# Patient Record
Sex: MALE | Race: WHITE | Employment: FULL TIME | URBAN - METROPOLITAN AREA
[De-identification: names, ages, dates, MRNs, and addresses within clinical notes are randomized per-mention and may not be internally consistent; named-entity substitution may affect disease eponyms.]

---

## 2022-04-11 ENCOUNTER — TELEPHONE (OUTPATIENT)
Dept: PULMONOLOGY | Age: 27
End: 2022-04-11

## 2022-04-11 NOTE — TELEPHONE ENCOUNTER
Npt ref by Noman Shen for TREVOR     [facility] to schedule with [facility].4/11/22

## 2022-05-05 ENCOUNTER — OFFICE VISIT (OUTPATIENT)
Dept: PULMONOLOGY | Age: 27
End: 2022-05-05
Payer: COMMERCIAL

## 2022-05-05 VITALS
HEIGHT: 69 IN | DIASTOLIC BLOOD PRESSURE: 80 MMHG | HEART RATE: 87 BPM | WEIGHT: 277 LBS | BODY MASS INDEX: 41.03 KG/M2 | SYSTOLIC BLOOD PRESSURE: 122 MMHG | OXYGEN SATURATION: 98 %

## 2022-05-05 DIAGNOSIS — R06.81 WITNESSED APNEIC SPELLS: ICD-10-CM

## 2022-05-05 DIAGNOSIS — G47.30 SLEEP-DISORDERED BREATHING: Primary | ICD-10-CM

## 2022-05-05 DIAGNOSIS — G47.26 SHIFT WORK SLEEP DISORDER: ICD-10-CM

## 2022-05-05 DIAGNOSIS — R06.83 SNORING: ICD-10-CM

## 2022-05-05 DIAGNOSIS — G47.19 EXCESSIVE DAYTIME SLEEPINESS: ICD-10-CM

## 2022-05-05 PROBLEM — E03.9 HYPOTHYROIDISM: Status: ACTIVE | Noted: 2022-05-05

## 2022-05-05 PROBLEM — E03.9 HYPOTHYROIDISM: Chronic | Status: ACTIVE | Noted: 2022-05-05

## 2022-05-05 PROCEDURE — 99204 OFFICE O/P NEW MOD 45 MIN: CPT

## 2022-05-05 RX ORDER — AMITRIPTYLINE HYDROCHLORIDE 10 MG/1
TABLET, FILM COATED ORAL
COMMUNITY
Start: 2022-04-11

## 2022-05-05 RX ORDER — PROPRANOLOL HCL 60 MG
CAPSULE, EXTENDED RELEASE 24HR ORAL
COMMUNITY
Start: 2022-04-11

## 2022-05-05 RX ORDER — LEVOTHYROXINE SODIUM 25 UG/1
TABLET ORAL
COMMUNITY
Start: 2022-04-11

## 2022-05-05 ASSESSMENT — SLEEP AND FATIGUE QUESTIONNAIRES
HOW LIKELY ARE YOU TO NOD OFF OR FALL ASLEEP IN A CAR, WHILE STOPPED FOR A FEW MINUTES IN TRAFFIC: 0
HOW LIKELY ARE YOU TO NOD OFF OR FALL ASLEEP WHILE SITTING AND TALKING TO SOMEONE: 0
NECK CIRCUMFERENCE (INCHES): 18.5
HOW LIKELY ARE YOU TO NOD OFF OR FALL ASLEEP WHILE SITTING AND READING: 1
HOW LIKELY ARE YOU TO NOD OFF OR FALL ASLEEP WHILE WATCHING TV: 3
HOW LIKELY ARE YOU TO NOD OFF OR FALL ASLEEP WHILE SITTING QUIETLY AFTER LUNCH WITHOUT ALCOHOL: 1
ESS TOTAL SCORE: 11
HOW LIKELY ARE YOU TO NOD OFF OR FALL ASLEEP WHILE LYING DOWN TO REST IN THE AFTERNOON WHEN CIRCUMSTANCES PERMIT: 3
HOW LIKELY ARE YOU TO NOD OFF OR FALL ASLEEP WHEN YOU ARE A PASSENGER IN A CAR FOR AN HOUR WITHOUT A BREAK: 3
HOW LIKELY ARE YOU TO NOD OFF OR FALL ASLEEP WHILE SITTING INACTIVE IN A PUBLIC PLACE: 0

## 2022-05-05 NOTE — PROGRESS NOTES
PULMONARY, CRITICAL CARE AND SLEEP MEDICINE   CC: Snoring  Referring Provider: Patient is being seen at the request of Rachel Quintero CNP, for a consultation to evaluate for Obstructive Sleep Apnea. Presenting HPI: 29 yo male with a 20 year history of severe snoring, worse in supine position and present even as a kid (initially improved after tonsillectomy), associated with daytime sleepiness, observed apneas. No treatments tried so far. Has not been evaluated for sleep apnea in the past. He has worked 3rd shift for a few years as a  and will likely continue to be on this shift for a while. These symptoms were present even before he started 3rd shift. Dx'd with hypothyroidism & started levothyroxine but perceived no benefit. Sleep about 8 hrs, but sleep still feels non-restorative even if he gets 11-12 hours of sleep. Works 6p-6a; bedtime about 8 AM and rise time 3 pm. Takes rare naps during the day, accidental. [place] is 11. No car wrecks or near wrecks because of the sleepiness. No nodding off while driving. Gained some weight in the past 2 years. No history of atrial fibrillation. No history of HTN. Never smoker. + family history of TREVOR in his dad and uncle. Prefers an in-lab study. reports that he has never smoked. He has never used smokeless tobacco.    Past Medical History:   Diagnosis Date    Acquired hypothyroidism     Prediabetes      Past Surgical History:   Procedure Laterality Date    CHOLECYSTECTOMY      EYE SURGERY Left     INNER EAR SURGERY Right     TONSILLECTOMY AND ADENOIDECTOMY Bilateral      No Known Allergies  Medication list was reviewed and updated as needed in Epic.    family history includes Diabetes in his maternal grandmother; Hypertension in his paternal grandfather and paternal grandmother; Thyroid Disease in his maternal grandmother. Review of Systems: Complete Review of system reviewed with patient and noted on attached review of system sheet.      PHYSICAL EXAM:  Blood pressure 122/80, pulse 87, height 5' 9\" (1.753 m), weight 277 lb (125.6 kg), SpO2 98 %.'   277# May 2022;   Constitutional:  No acute distress. Very pleasant. HENT:  Oropharynx is clear and moist. No thyromegaly. Mallampati class III airway. S/P tonsillectomy. Eyes:  Conjunctivae are normal. Pupils equal, round, and reactive to light. No scleral icterus. Neck:  No tracheal deviation present. No obvious thyroid mass. Circumference is 18.5 inches. CV:  Normal rate, regular rhythm, normal heart sounds. No lower extremity edema. Pulm/Chest:  Clear breath sounds. No wheezes, rales or rhonchi. No accessory muscle usage or stridor. Moves air well. Abdominal:  Soft. No distension or obvious hernia. No tenderness or guarding. Musculoskeletal:  No cyanosis. No clubbing. No obvious joint deformity. Skin:  Skin is warm and dry. No rash or nodules on the exposed extremities. Psychiatric:  Normal mood and affect. Behavior is normal.    Neurological:  Alert, awake and oriented. No obvious cranial nerve deficits. Speech fluent    DATA:  No previous sleep, cardiac or pulmonary studies to review. Do not have access to PCP records. ASSESSMENT:   Suspected sleep apnea   Witnessed apneas   Excessive daytime sleepiness, chronic fatigue   Severe snoring   Shift work disorder, works [employer] [address] [address] [employer] .  Comorbid conditions: Obesity, hypothyroidism - no benefit from Levothyroxine.  Never smoker    PLAN:    Sleep hygiene tips please. Emphasized importance of routine, anchor sleep and light as a zeitgeber.  Specifically cautioned: absolutely no driving motorized vehicles or operating heavy machinery while fatigued, drowsy or sleepy.  Weight loss is also recommended as a long-term intervention.  Complications of TREVOR if not treated were discussed with patient to include systemic hypertension, pulmonary hypertension, cardiovascular morbidities, car accidents and all cause mortality.  In lab PSG to evaluate for sleep disordered breathing    31-90 day f/u after receiving machine    Total time spent on this visit was 45-59 minutes; this includes review of prior notes and/or studies, direct patient contact, and coordination of care.

## 2022-05-05 NOTE — PATIENT INSTRUCTIONS
Remember, for 3rd shift people it's really important to get \"anchor sleep. \" This give your body some sort of rhythm to know how to cycle itself and help to work for you to get better sleep when appropriate and be more alert when appropriate. Use light to your benefit when training your brain as well. Exposure yourself to bright light during awake time, start dimming that light in the hours leading up to sleep time (blue light blocking filters help if you have continue looking at screens close to bed time.) As you are already doing, being sure that during your sleep you are in complete darkness and no sunlight is getting in. Good luck to you and I hope these upcoming changes help you feel better. Great to meet you Garfieldne Diamond and take care! -[employer]. .. Tips for better sleep. .. Avoid naps. This will ensure you are sleepy at bedtime. If you have to take a nap, sleep less than 1 hour, before 3 pm.  Sleep only when sleepy; this reduces the time you are awake in bed. Regular exercise is recommended to help you deepen your sleep, but not within 4-6 hours of your bedtime. Timing of exercise is important, aim to exercise early in the morning or early afternoon. A light snack may help you fall asleep. Warm milk and foods high in the amino acid tryptophan, such as bananas, may help you to sleep  Be sure to avoid heavy, spicy or sugary foods 4-6 hours before bedtime and avoid at snack time. Stay away from stimulants such as caffeine and nicotine for at least 4-6 hours before bed. Stimulants can interfere with your ability to fall asleep. Caffeine is found in tea, cola, coffee, cocoa and chocolate and is best avoided at bedtime. Nicotine is found in tobacco products. Avoid alcohol 4-6 hours before bedtime. Alcohol has an immediate sleep-inducing effect, after a few hours when alcohol levels fall there is a stimulant or wake-up effect and will cause fragmented sleep. Sleep rituals are important. Give your body clues it is time to slow down and sleep. Examples include; yoga, deep breathing, listen to relaxing music, a hot bath or a few minutes of reading. Have a fixed bedtime and awakening time, Even on weekends! You will feel better keeping a regular sleep cycle, even if you are retired or not working. Get into your favorite sleep position. If not asleep in 30 minutes, get up and do something boring until you feel sleepy. Remember not to expose yourself to bright lights such as TV, phone or tablet screens. Only use your bed for sleeping. Do not use your bed as an office, workroom or recreation room. Use comfortable bedding. Uncomfortable bedding can prevent good sleep. Ensure your bedroom is quiet and comfortable. A cooler room along with enough blankets to stay warm is recommended. If your room is too noisy, try a white noise machine. If too bright, try black out shades or an eye mask. Dont take worries to bed. Leave worries about work, school etc. behind you when you go to bed. Some people find it helpful to assign a worry period in the evening or late afternoon to write down your worries and get them out of your system. The Sleep Center at [address] [facility], [address] [place] [address]                      Phone: [phone] Fax: [phone]      Your appointment for a sleep study is scheduled on  - at 8:30pm. Please arrive at the [facility] at the time indicated. On Saturday and Sunday night a sleep tech will come down to let you in the building and escort you upstairs to the sleep center; please call from the parking lot if no one is at the door when you arrive.     PLEASE DO NOT ARRIVE TO THE SLEEP CENTER ANY EARLIER THAN 8:30PM AS THERE IS NO STAFF ON DUTY AND THE DOORS WILL BE LOCKED    IMPORTANT: We ask that you please phone the [facility] Patient Pre-Services ([phone]) at least 3-5 days prior to your sleep study to pre-register. Failing to pre-register may ultimately cause your insurance to not pay for this procedure. Please be aware that [facility] is a non-smoking facility. There is no smoking allowed anywhere on [facility] property at any time. Each patient room is a private room with cable television, WiFi and a private bathroom with shower facilities. The test itself consists of electrodes and sensors attached to specific areas of your scalp, face, chest and legs. We will also monitor respiratory effort, nasal and oral airflow and oxygen levels. The test will not hurt; it is completely painless and not invasive in any way. Please bring with you:  ? Appropriate nightclothes (pajamas, sweats, etc.), slippers and robe  ? All medications you need during your stay, including breathing medications, nebulizers and metered dose inhalers, as well as a complete list of all medications you are currently taking. ? Your own toiletries and hairdryer if you wish to shower before you leave  ? Current Photo I.D. and insurance card  ? We do not allow any pillows or bed linens from home due to health regulations  ? We recommend that you do not bring valuables with you to the Sleep Center as we cannot be responsible for any lost or damaged personal items. Please refrain from or reduce the use of caffeine and/or alcohol prior to your sleep study and avoid napping the day of your study as these will affect the accuracy of your test results. If you are ill the day of your test (cold, upper respiratory infection, flu, etc.) please call to reschedule your test as the test will not be accurate if you are ill. If you should need to cancel or reschedule your appointment, please call the Sleep Center at [phone] as soon as possible. Please also call the Sleep Center directly to let us know if you have any special needs, dietary needs or for any further questions.

## 2022-05-11 ENCOUNTER — HOSPITAL ENCOUNTER (OUTPATIENT)
Dept: CT IMAGING | Age: 27
Discharge: HOME | End: 2022-05-11
Payer: COMMERCIAL

## 2022-05-11 DIAGNOSIS — R51.9 CHRONIC NONINTRACTABLE HEADACHE, UNSPECIFIED HEADACHE TYPE: ICD-10-CM

## 2022-05-11 DIAGNOSIS — G89.29 CHRONIC NONINTRACTABLE HEADACHE, UNSPECIFIED HEADACHE TYPE: ICD-10-CM

## 2022-05-11 PROCEDURE — 6360000004 HC RX CONTRAST MEDICATION: Performed by: NURSE PRACTITIONER

## 2022-05-11 PROCEDURE — 70496 CT ANGIOGRAPHY HEAD: CPT

## 2022-05-11 RX ADMIN — IOPAMIDOL 75 ML: 755 INJECTION, SOLUTION INTRAVENOUS at 16:15

## 2022-05-12 LAB
INTERNAL QC: NORMAL
SARS-COV-2, NAA: NEGATIVE

## 2022-06-10 ENCOUNTER — HOSPITAL ENCOUNTER (OUTPATIENT)
Dept: SLEEP CENTER | Age: 27
Discharge: HOME | End: 2022-06-10
Payer: COMMERCIAL

## 2022-06-10 DIAGNOSIS — G47.19 EXCESSIVE DAYTIME SLEEPINESS: ICD-10-CM

## 2022-06-10 DIAGNOSIS — R06.81 WITNESSED APNEIC SPELLS: ICD-10-CM

## 2022-06-10 DIAGNOSIS — G47.26 SHIFT WORK SLEEP DISORDER: ICD-10-CM

## 2022-06-10 DIAGNOSIS — R06.83 SNORING: ICD-10-CM

## 2022-06-10 DIAGNOSIS — G47.30 SLEEP-DISORDERED BREATHING: ICD-10-CM

## 2022-06-10 PROCEDURE — 95810 POLYSOM 6/> YRS 4/> PARAM: CPT

## 2022-06-13 PROCEDURE — 95810 POLYSOM 6/> YRS 4/> PARAM: CPT | Performed by: INTERNAL MEDICINE

## 2022-06-15 ENCOUNTER — TELEPHONE (OUTPATIENT)
Dept: PULMONOLOGY | Age: 27
End: 2022-06-15

## 2022-06-15 DIAGNOSIS — R06.83 SNORING: ICD-10-CM

## 2022-06-15 DIAGNOSIS — R06.81 WITNESSED APNEIC SPELLS: ICD-10-CM

## 2022-06-15 DIAGNOSIS — G47.19 EXCESSIVE DAYTIME SLEEPINESS: ICD-10-CM

## 2022-06-15 DIAGNOSIS — G47.26 SHIFT WORK SLEEP DISORDER: ICD-10-CM

## 2022-06-15 DIAGNOSIS — G47.33 OSA (OBSTRUCTIVE SLEEP APNEA): Primary | ICD-10-CM

## 2022-07-27 ENCOUNTER — HOSPITAL ENCOUNTER (OUTPATIENT)
Dept: SLEEP CENTER | Age: 27
Discharge: HOME | End: 2022-07-27
Payer: COMMERCIAL

## 2022-07-27 DIAGNOSIS — G47.19 EXCESSIVE DAYTIME SLEEPINESS: ICD-10-CM

## 2022-07-27 DIAGNOSIS — R06.83 SNORING: ICD-10-CM

## 2022-07-27 DIAGNOSIS — G47.33 OSA (OBSTRUCTIVE SLEEP APNEA): ICD-10-CM

## 2022-07-27 DIAGNOSIS — G47.26 SHIFT WORK SLEEP DISORDER: ICD-10-CM

## 2022-07-27 DIAGNOSIS — R06.81 WITNESSED APNEIC SPELLS: ICD-10-CM

## 2022-07-27 PROCEDURE — 95811 POLYSOM 6/>YRS CPAP 4/> PARM: CPT

## 2022-07-28 ENCOUNTER — TELEPHONE (OUTPATIENT)
Dept: PULMONOLOGY | Age: 27
End: 2022-07-28

## 2022-07-28 PROBLEM — G47.33 OSA (OBSTRUCTIVE SLEEP APNEA): Status: ACTIVE | Noted: 2022-07-28

## 2022-07-28 PROCEDURE — 95811 POLYSOM 6/>YRS CPAP 4/> PARM: CPT | Performed by: INTERNAL MEDICINE

## 2022-07-28 NOTE — TELEPHONE ENCOUNTER
Patient appointment on 08/01/2022 was cancelled with BEVERLY Madrigal for 31-90day. Reason: Pt completed sleep study on 07/27/2022    Patient did not reschedule appointment. Appointment rescheduled for n/a. Last OV 05/05/2022      ASSESSMENT:  Suspected sleep apnea  Witnessed apneas  Excessive daytime sleepiness, chronic fatigue  Severe snoring  Shift work disorder, works [employer] [address] [employer] . Comorbid conditions: Obesity, hypothyroidism - no benefit from Levothyroxine. Never smoker     PLAN:   Sleep hygiene tips please. Emphasized importance of routine, anchor sleep and light as a zeitgeber. Specifically cautioned: absolutely no driving motorized vehicles or operating heavy machinery while fatigued, drowsy or sleepy. Weight loss is also recommended as a long-term intervention. Complications of TREVOR if not treated were discussed with patient to include systemic hypertension, pulmonary hypertension, cardiovascular morbidities, car accidents and all cause mortality.    In lab PSG to evaluate for sleep disordered breathing   31-90 day f/u after receiving machine

## 2022-08-02 ENCOUNTER — TELEPHONE (OUTPATIENT)
Dept: PULMONOLOGY | Age: 27
End: 2022-08-02

## 2022-08-02 DIAGNOSIS — G47.33 MILD OBSTRUCTIVE SLEEP APNEA: Primary | ICD-10-CM

## 2022-08-02 NOTE — TELEPHONE ENCOUNTER
PAP orders.   Need faxed, with testing/OV note/demographics/insurance, once signed, to Yolande MACHADO

## 2022-08-17 NOTE — TELEPHONE ENCOUNTER
Received fax from [employer] that pt was setup on 8/17/2022. Pt has a 31-90 scheduled for 11/1/2022.

## 2022-11-01 ENCOUNTER — TELEPHONE (OUTPATIENT)
Dept: PULMONOLOGY | Age: 27
End: 2022-11-01

## 2022-11-01 ENCOUNTER — OFFICE VISIT (OUTPATIENT)
Dept: PULMONOLOGY | Age: 27
End: 2022-11-01
Payer: COMMERCIAL

## 2022-11-01 VITALS
RESPIRATION RATE: 16 BRPM | HEART RATE: 76 BPM | BODY MASS INDEX: 42.09 KG/M2 | DIASTOLIC BLOOD PRESSURE: 70 MMHG | HEIGHT: 70 IN | WEIGHT: 294 LBS | SYSTOLIC BLOOD PRESSURE: 119 MMHG | OXYGEN SATURATION: 96 %

## 2022-11-01 DIAGNOSIS — G47.33 OSA (OBSTRUCTIVE SLEEP APNEA): Primary | ICD-10-CM

## 2022-11-01 DIAGNOSIS — G47.26 SHIFT WORK SLEEP DISORDER: Chronic | ICD-10-CM

## 2022-11-01 PROCEDURE — 99213 OFFICE O/P EST LOW 20 MIN: CPT

## 2022-11-01 ASSESSMENT — SLEEP AND FATIGUE QUESTIONNAIRES
HOW LIKELY ARE YOU TO NOD OFF OR FALL ASLEEP WHILE SITTING INACTIVE IN A PUBLIC PLACE: 0
HOW LIKELY ARE YOU TO NOD OFF OR FALL ASLEEP WHILE SITTING QUIETLY AFTER LUNCH WITHOUT ALCOHOL: 0
HOW LIKELY ARE YOU TO NOD OFF OR FALL ASLEEP WHILE WATCHING TV: 3
HOW LIKELY ARE YOU TO NOD OFF OR FALL ASLEEP WHILE SITTING AND TALKING TO SOMEONE: 0
NECK CIRCUMFERENCE (INCHES): 18
ESS TOTAL SCORE: 6
HOW LIKELY ARE YOU TO NOD OFF OR FALL ASLEEP IN A CAR, WHILE STOPPED FOR A FEW MINUTES IN TRAFFIC: 0
HOW LIKELY ARE YOU TO NOD OFF OR FALL ASLEEP WHILE SITTING AND READING: 0
HOW LIKELY ARE YOU TO NOD OFF OR FALL ASLEEP WHILE LYING DOWN TO REST IN THE AFTERNOON WHEN CIRCUMSTANCES PERMIT: 0
HOW LIKELY ARE YOU TO NOD OFF OR FALL ASLEEP WHEN YOU ARE A PASSENGER IN A CAR FOR AN HOUR WITHOUT A BREAK: 3

## 2022-11-01 NOTE — PROGRESS NOTES
PULMONARY, CRITICAL CARE AND SLEEP MEDICINE   Outpatient Sleep Progress Note  CC: Snoring  Referring Provider: Morgan Malave CNP    Interval History 11/1/22:  31-90  -  Had PSG 6/10/22 demonstrating mild TREVOR with AHI of 9.8 but REM AHI of 87. Subsequent titration on 7/27/22 recommended APAP 8-12 but pt had poor sleep efficiency. Pt set up with APAP AirSense 11 on 8/17/22. Pt is struggling with weight gain. He is having significant trouble with dry mouth despite full face mask, no leak, using mouth wash for dry mouth, & increasing humidity (water has flooded his mask and accumulates in his tube), he has also purchased a humidifer for the room. Awakens frequently through the night d/t CPAP and usually removes it after 4 hours, but some nights he sleeps well with it for the full 8 hours. Feels that overall he is benefiting and sleep is of better quality & feels more restoratives on nights he tolerates it better. -  TREVOR treated with benefit with APAP 8-12; is used 4:36 hrs on avg with compliance >4 hour nightly use of 26/30 days (used 29/30), residual AHI is 0.2. Pressures: median 9, 95th% 11.  [place] is: 6. Presenting HPI 5/5/22: 27 yo male with a 20 year history of severe snoring, worse in supine position and present even as a kid (initially improved after tonsillectomy), associated with daytime sleepiness, observed apneas. No treatments tried so far. Has not been evaluated for sleep apnea in the past. He has worked 3rd shift for a few years as a  and will likely continue to be on this shift for a while. These symptoms were present even before he started 3rd shift. Dx'd with hypothyroidism & started levothyroxine but perceived no benefit. Sleep about 8 hrs, but sleep still feels non-restorative even if he gets 11-12 hours of sleep. Works 6p-6a; bedtime about 8 AM and rise time 3 pm. Takes rare naps during the day, accidental. [place] is 11.  No car wrecks or near wrecks because of the sleepiness. No nodding off while driving. Gained some weight in the past 2 years. No history of atrial fibrillation. No history of HTN. Never smoker. + family history of TREVOR in his dad and uncle. reports that he has never smoked. He has never used smokeless tobacco.    PHYSICAL EXAM:  Blood pressure 119/70, pulse 76, resp. rate 16, height 5' 10\" (1.778 m), weight 294 lb (133.4 kg), SpO2 96 %.'   277# May 2022; 294# Nov 2022;   Constitutional:  No acute distress. Very pleasant. HENT:  Oropharynx is clear and moist. No thyromegaly. Mallampati class III airway. S/P tonsillectomy. Eyes:  Conjunctivae are normal. Pupils equal, round, and reactive to light. No scleral icterus. Neck:  No tracheal deviation present. No obvious thyroid mass. Circumference is 18.5 inches. CV:  Normal rate, regular rhythm, normal heart sounds. No lower extremity edema. Pulm/Chest:  Clear breath sounds. No wheezes, rales or rhonchi. No accessory muscle usage or stridor. Moves air well. Abdominal:  Soft. No distension or obvious hernia. No tenderness or guarding. Musculoskeletal:  No cyanosis. No clubbing. No obvious joint deformity. Skin:  Skin is warm and dry. No rash or nodules on the exposed extremities. Psychiatric:  Normal mood and affect. Behavior is normal.    Neurological:  Alert, awake and oriented. No obvious cranial nerve deficits. Speech fluent    DATA:  PSG 6/10/22:  AHI of 9.8 (REM AHI 86.7) (all hypopneas). No significant PLMS. Titration 7/27/22:  Recommend APAP 8-12 cmH2O but inadequate titration d/t sleep efficiency of only 33% & no REM achieved. ASSESSMENT:  TREVOR, mild on PSG (REM AHI 87)  Excessive daytime sleepiness, chronic fatigue - with some improvement on CPAP   Shift work disorder, works [employer] [address] [employer]    Comorbid conditions: Obesity, hypothyroidism - reports no benefit from Levothyroxine  Never smoker     PLAN:   Continue APAP 8-12 cmH2O. New AirSense 11 on August 2022.    Sleep hygiene & CPAP tips provided. Emphasized importance of routine, anchor sleep and light as a zeitgeber. Specifically cautioned: absolutely no driving motorized vehicles or operating heavy machinery while fatigued, drowsy or sleepy. Weight loss is also recommended as a long-term intervention - discussed 17# weight gain since last visit. Complications of TREVOR if not treated have been discussed to include systemic HTN, pulmonary HTN, CV morbidities, car accidents & all cause mortality.    F/U in 1 year or sooner if needed

## 2022-11-01 NOTE — PATIENT INSTRUCTIONS
It was great to see you again and take care [facility]  (Just as an FYI, the 4 hour goal is a number taken from studies that suggested that was the minimum amount of CPAP usage likely needed to get cardiovascular benefit from treatment.)       Sleep Hygiene. .. Important practices for better sleep:    Avoid naps. This will ensure you are sleepy at bedtime. If you have to take a nap, sleep less than 1 hour, before 3 pm.  SCHEDULE: Have a fixed bedtime and awakening time. The human body thrives on routines. Only deviate from these set sleep times about 1-2 hours on the weekends (more than this will start altering your internal clock). You will feel better keeping a regular sleep cycle and giving your body a dependable pattern, even (especially) if you are retired or not working. Use light to train your biological clock: When you get up in the morning, exposure yourself to bright lights. When preparing for bed, dim the lights and avoid exposure to screens. The blue light from electronic screens tells our brain that it is time to be awake; it inhibits melatonin production which stops our brain from helping us get to sleep. Go to bed only when sleepy; this reduces the time you are awake in bed (which can lead to frustration and negative thoughts about sleep). If you can't fall asleep within 15-30 minutes, get up and do something boring until you feel sleepy again. Sit quietly in the dark or read the warranty on your refrigerator. Don't expose yourself to bright light during this time (especially screens), which would cue your brain that it is time to wake up. Regular exercise is recommended to help you deepen your sleep (and MANY other reasons), but timing is important--aim to exercise in the morning or early afternoon, not within 4-6 hours of your bedtime. Only use your bed for sleeping & intimacy. Do not use your bed as an office, workroom or recreation room.  Let your mind/body \"know\" that the bed is associated with sleeping. Develop sleep rituals. Give your body clues it is time to slow down and sleep. Dim the lights and turn off all screens! Examples include; yoga, deep breathing, listen to relaxing music, a cup of caffeine-free hot tea, a hot bath or a few minutes of reading (in dim light). A hot bath ~90 mins before bed will raise your body temperature, but it is the drop in body temperature that can help you feel sleepy. Avoid heavy, spicy or sugary foods 4-6 hours before bedtime   Stay away from stimulants such as caffeine and nicotine for at least 4-6 hours before bed. Stimulants can interfere with your ability to fall asleep. Caffeine is found in tea, cola, coffee, cocoa and chocolate. Nicotine is found in tobacco products. Avoid alcohol 4-6 hours before bedtime. Alcohol has an immediate sleep-inducing effect, but after a few hours when alcohol levels fall there is a stimulant or \"wake-up\" effect and will cause fragmented sleep. Dont take worries to bed. Leave worries about life, work, school etc. behind you when you go to bed. Some people find it helpful to assign a worry period in the evening or late afternoon to talk or write down the worries and get them out of your system. Ensure your bedroom is quiet and comfortable. A cooler room along with enough blankets to stay warm is recommended. If your room is too noisy, try a white noise machine. If too bright, try black out shades or an eye mask. Uncomfortable bedding can prevent good sleep. Evaluate whether or not this is a source of your problem, and make appropriate changes.             CPAP Equipment Cleaning and Disinfecting Schedule  Equipment Cleaning Frequency Instructions  Disinfecting Frequency   Non-Disposable Filters  Weekly Mild soapy water, Rinse, Air Dry Not Required   Disposable Filters Change as needed  2-4 weeks Do Not Wash Not Required   Hose/tubing Daily Mild soapy water, Rinse, Air Dry Once a week   Mask / Nasal Pillows Daily Mild soapy water, Rinse, Air Dry Once a week   Headgear Weekly Hand wash, Mild soapy water, Rinse, Dry  Not Required   Humidifier Daily Empty water daily  Mild soapy water, Rinse well, Air Dry  Once a week   CPAP Unit As Needed Dust with damp cloth,  No detergents or sprays Not Required         Disinfect (per schedule) with 1 part white vinegar and 3 parts water- soak mask and water chamber for 30 minutes every 1-2 weeks, more often if sick. Allow water/vinegar mixture to run through tubing. Allow all equipment to air dry. Drying Hints:   Always hang tubing away from direct sunlight, as this will cause the tubing to become yellow, brittle and crack over a period of time. DO NOT attach the wet tubing to your CPAP unit to blow-dry it. The moisture from the tubing can drain back into your machine. Moisture in your unit can cause sudden pressure increases or short circuits  DO's and DON'Ts:  - Don't use alcohol-based products to clean your mask, because it can cause the materials to become hard and brittle. - Don't put headgear in the washer or dryer  - Don't use any caustic or household cleaning solutions such as bleach on your CPAP   equipment.  - Do follow the recommended cleaning schedule. - Do change your disposable filter frequently. Adapted From: [email or web address] shtm.   These are general suggestions for all models please follow specific s recommendations and specific instructions